# Patient Record
Sex: FEMALE | Race: BLACK OR AFRICAN AMERICAN | NOT HISPANIC OR LATINO | Employment: OTHER | URBAN - METROPOLITAN AREA
[De-identification: names, ages, dates, MRNs, and addresses within clinical notes are randomized per-mention and may not be internally consistent; named-entity substitution may affect disease eponyms.]

---

## 2017-03-28 ENCOUNTER — TELEPHONE (OUTPATIENT)
Dept: GASTROENTEROLOGY | Facility: CLINIC | Age: 62
End: 2017-03-28

## 2017-03-28 NOTE — TELEPHONE ENCOUNTER
Unable to leave message. Pt voice mailbox full. Pt's appt for today 3/28/17 needs to be rescheduled. Dr. Roper out sick.

## 2021-11-02 ENCOUNTER — DOCUMENT SCAN (OUTPATIENT)
Dept: HOME HEALTH SERVICES | Facility: HOSPITAL | Age: 66
End: 2021-11-02
Payer: MEDICARE

## 2022-01-18 ENCOUNTER — HOSPITAL ENCOUNTER (OUTPATIENT)
Facility: HOSPITAL | Age: 67
Discharge: PSYCHIATRIC OR SUBSTANCE USE FACILITY | End: 2022-01-20
Attending: EMERGENCY MEDICINE | Admitting: EMERGENCY MEDICINE
Payer: MEDICARE

## 2022-01-18 DIAGNOSIS — R41.82 ALTERED MENTAL STATUS: ICD-10-CM

## 2022-01-18 DIAGNOSIS — F03.90 MAJOR NEUROCOGNITIVE DISORDER: ICD-10-CM

## 2022-01-18 DIAGNOSIS — F20.9 SCHIZOPHRENIA, UNSPECIFIED TYPE: ICD-10-CM

## 2022-01-18 DIAGNOSIS — R74.8 HIGH SERUM TREPONEMA-SPECIFIC ANTIBODY TITER: ICD-10-CM

## 2022-01-18 DIAGNOSIS — A52.3 NEUROSYPHILIS: ICD-10-CM

## 2022-01-18 DIAGNOSIS — A53.0 POSITIVE RPR TEST: Primary | ICD-10-CM

## 2022-01-18 PROBLEM — F19.10 POLYSUBSTANCE ABUSE: Status: ACTIVE | Noted: 2022-01-18

## 2022-01-18 LAB
ALBUMIN SERPL BCP-MCNC: 4.2 G/DL (ref 3.5–5.2)
ALP SERPL-CCNC: 111 U/L (ref 55–135)
ALT SERPL W/O P-5'-P-CCNC: 29 U/L (ref 10–44)
AMPHET+METHAMPHET UR QL: NEGATIVE
ANION GAP SERPL CALC-SCNC: 9 MMOL/L (ref 8–16)
APAP SERPL-MCNC: <3 UG/ML (ref 10–20)
AST SERPL-CCNC: 19 U/L (ref 10–40)
BARBITURATES UR QL SCN>200 NG/ML: NEGATIVE
BASOPHILS # BLD AUTO: 0.06 K/UL (ref 0–0.2)
BASOPHILS NFR BLD: 0.7 % (ref 0–1.9)
BENZODIAZ UR QL SCN>200 NG/ML: NEGATIVE
BILIRUB SERPL-MCNC: 0.2 MG/DL (ref 0.1–1)
BILIRUB UR QL STRIP: NEGATIVE
BUN SERPL-MCNC: 22 MG/DL (ref 8–23)
BZE UR QL SCN: NEGATIVE
CALCIUM SERPL-MCNC: 9.5 MG/DL (ref 8.7–10.5)
CANNABINOIDS UR QL SCN: NEGATIVE
CHLORIDE SERPL-SCNC: 102 MMOL/L (ref 95–110)
CLARITY UR: CLEAR
CO2 SERPL-SCNC: 29 MMOL/L (ref 23–29)
COLOR UR: YELLOW
CREAT SERPL-MCNC: 0.7 MG/DL (ref 0.5–1.4)
CREAT UR-MCNC: 109.9 MG/DL (ref 15–325)
CTP QC/QA: YES
DIFFERENTIAL METHOD: ABNORMAL
EOSINOPHIL # BLD AUTO: 0.1 K/UL (ref 0–0.5)
EOSINOPHIL NFR BLD: 1.3 % (ref 0–8)
ERYTHROCYTE [DISTWIDTH] IN BLOOD BY AUTOMATED COUNT: 14.1 % (ref 11.5–14.5)
EST. GFR  (AFRICAN AMERICAN): >60 ML/MIN/1.73 M^2
EST. GFR  (NON AFRICAN AMERICAN): >60 ML/MIN/1.73 M^2
ETHANOL SERPL-MCNC: <10 MG/DL
GLUCOSE SERPL-MCNC: 128 MG/DL (ref 70–110)
GLUCOSE UR QL STRIP: NEGATIVE
HCT VFR BLD AUTO: 42.5 % (ref 37–48.5)
HGB BLD-MCNC: 13.1 G/DL (ref 12–16)
HGB UR QL STRIP: NEGATIVE
IMM GRANULOCYTES # BLD AUTO: 0.03 K/UL (ref 0–0.04)
IMM GRANULOCYTES NFR BLD AUTO: 0.4 % (ref 0–0.5)
KETONES UR QL STRIP: NEGATIVE
LEUKOCYTE ESTERASE UR QL STRIP: NEGATIVE
LYMPHOCYTES # BLD AUTO: 2.9 K/UL (ref 1–4.8)
LYMPHOCYTES NFR BLD: 34.7 % (ref 18–48)
MAGNESIUM SERPL-MCNC: 2 MG/DL (ref 1.6–2.6)
MCH RBC QN AUTO: 27.4 PG (ref 27–31)
MCHC RBC AUTO-ENTMCNC: 30.8 G/DL (ref 32–36)
MCV RBC AUTO: 89 FL (ref 82–98)
METHADONE UR QL SCN>300 NG/ML: NEGATIVE
MONOCYTES # BLD AUTO: 0.9 K/UL (ref 0.3–1)
MONOCYTES NFR BLD: 10.4 % (ref 4–15)
NEUTROPHILS # BLD AUTO: 4.3 K/UL (ref 1.8–7.7)
NEUTROPHILS NFR BLD: 52.5 % (ref 38–73)
NITRITE UR QL STRIP: NEGATIVE
NRBC BLD-RTO: 0 /100 WBC
OPIATES UR QL SCN: NEGATIVE
PCP UR QL SCN>25 NG/ML: NEGATIVE
PH UR STRIP: 5 [PH] (ref 5–8)
PLATELET # BLD AUTO: 180 K/UL (ref 150–450)
PMV BLD AUTO: 10.4 FL (ref 9.2–12.9)
POTASSIUM SERPL-SCNC: 4.3 MMOL/L (ref 3.5–5.1)
PROT SERPL-MCNC: 7.2 G/DL (ref 6–8.4)
PROT UR QL STRIP: NEGATIVE
RBC # BLD AUTO: 4.78 M/UL (ref 4–5.4)
SARS-COV-2 RDRP RESP QL NAA+PROBE: NEGATIVE
SODIUM SERPL-SCNC: 140 MMOL/L (ref 136–145)
SP GR UR STRIP: 1.02 (ref 1–1.03)
TOXICOLOGY INFORMATION: NORMAL
TSH SERPL DL<=0.005 MIU/L-ACNC: 0.68 UIU/ML (ref 0.4–4)
URN SPEC COLLECT METH UR: NORMAL
UROBILINOGEN UR STRIP-ACNC: NEGATIVE EU/DL
WBC # BLD AUTO: 8.21 K/UL (ref 3.9–12.7)

## 2022-01-18 PROCEDURE — 63600175 PHARM REV CODE 636 W HCPCS: Performed by: EMERGENCY MEDICINE

## 2022-01-18 PROCEDURE — 80307 DRUG TEST PRSMV CHEM ANLYZR: CPT | Performed by: EMERGENCY MEDICINE

## 2022-01-18 PROCEDURE — 80143 DRUG ASSAY ACETAMINOPHEN: CPT | Performed by: EMERGENCY MEDICINE

## 2022-01-18 PROCEDURE — 82077 ASSAY SPEC XCP UR&BREATH IA: CPT | Performed by: EMERGENCY MEDICINE

## 2022-01-18 PROCEDURE — 81003 URINALYSIS AUTO W/O SCOPE: CPT | Mod: 59 | Performed by: EMERGENCY MEDICINE

## 2022-01-18 PROCEDURE — G0425 INPT/ED TELECONSULT30: HCPCS | Mod: 95,,, | Performed by: PSYCHIATRY & NEUROLOGY

## 2022-01-18 PROCEDURE — 80053 COMPREHEN METABOLIC PANEL: CPT | Performed by: EMERGENCY MEDICINE

## 2022-01-18 PROCEDURE — 99285 EMERGENCY DEPT VISIT HI MDM: CPT | Mod: 25

## 2022-01-18 PROCEDURE — 96365 THER/PROPH/DIAG IV INF INIT: CPT

## 2022-01-18 PROCEDURE — 96366 THER/PROPH/DIAG IV INF ADDON: CPT

## 2022-01-18 PROCEDURE — 25000003 PHARM REV CODE 250: Performed by: EMERGENCY MEDICINE

## 2022-01-18 PROCEDURE — G0378 HOSPITAL OBSERVATION PER HR: HCPCS

## 2022-01-18 PROCEDURE — 85025 COMPLETE CBC W/AUTO DIFF WBC: CPT | Performed by: EMERGENCY MEDICINE

## 2022-01-18 PROCEDURE — G0425 PR INPT TELEHEALTH CONSULT 30M: ICD-10-PCS | Mod: 95,,, | Performed by: PSYCHIATRY & NEUROLOGY

## 2022-01-18 PROCEDURE — U0002 COVID-19 LAB TEST NON-CDC: HCPCS | Performed by: EMERGENCY MEDICINE

## 2022-01-18 PROCEDURE — 84443 ASSAY THYROID STIM HORMONE: CPT | Performed by: EMERGENCY MEDICINE

## 2022-01-18 PROCEDURE — 83735 ASSAY OF MAGNESIUM: CPT | Performed by: EMERGENCY MEDICINE

## 2022-01-18 PROCEDURE — 25000003 PHARM REV CODE 250: Performed by: NURSE PRACTITIONER

## 2022-01-18 RX ORDER — ACETAMINOPHEN 500 MG
1000 TABLET ORAL EVERY 6 HOURS PRN
Status: DISCONTINUED | OUTPATIENT
Start: 2022-01-18 | End: 2022-01-20 | Stop reason: HOSPADM

## 2022-01-18 RX ORDER — ACETAMINOPHEN 500 MG
1000 TABLET ORAL EVERY 8 HOURS PRN
Status: DISCONTINUED | OUTPATIENT
Start: 2022-01-18 | End: 2022-01-18

## 2022-01-18 RX ORDER — ONDANSETRON 2 MG/ML
4 INJECTION INTRAMUSCULAR; INTRAVENOUS EVERY 6 HOURS PRN
Status: DISCONTINUED | OUTPATIENT
Start: 2022-01-18 | End: 2022-01-20 | Stop reason: HOSPADM

## 2022-01-18 RX ORDER — SODIUM CHLORIDE 0.9 % (FLUSH) 0.9 %
10 SYRINGE (ML) INJECTION EVERY 12 HOURS PRN
Status: DISCONTINUED | OUTPATIENT
Start: 2022-01-18 | End: 2022-01-20 | Stop reason: HOSPADM

## 2022-01-18 RX ORDER — ENOXAPARIN SODIUM 100 MG/ML
40 INJECTION SUBCUTANEOUS EVERY 24 HOURS
Status: DISCONTINUED | OUTPATIENT
Start: 2022-01-19 | End: 2022-01-20 | Stop reason: HOSPADM

## 2022-01-18 RX ADMIN — PENICILLIN G POTASSIUM 4 MILLION UNITS: 5000000 INJECTION, POWDER, FOR SOLUTION INTRAMUSCULAR; INTRAVENOUS at 11:01

## 2022-01-18 RX ADMIN — ACETAMINOPHEN 1000 MG: 500 TABLET ORAL at 11:01

## 2022-01-18 NOTE — ED PROVIDER NOTES
Encounter Date: 1/18/2022    SCRIBE #1 NOTE: I, Ramses Liriano, am scribing for, and in the presence of,  Mario Gurrola MD. I have scribed the following portions of the note - Other sections scribed: HPI, ROS.       History     Chief Complaint   Patient presents with    Psychiatric Evaluation     Pt BIB [place] [employer] in handcuffs. Pt sent here from the [facility] with PEC in place. Pt sent to ED for bizarre behavior and possible admission to [facility]. Denies SI/HI/AH/VH     This is a 66 year old female for psychiatric evaluation. Patient states that she came from [facility], where she was sent from [facility]. Her initial placement was for treatment of depression. She was told that she could not go home yet as there was no transport available. Patient does not endorse any hallucinations, suicidal thoughts, or thoughts of hurting others. She states that she would simply like to go home. Denies any other symptoms, including chills, fevers, sweats, ear pain, sore throat, eye problems, cough, shortness of breath, chest pain, abdominal pain, vomiting, diarrhea, dysuria, arm or leg pain, rashes, or headaches. No PMHx of schizophrenia.    The history is provided by the patient.     Review of patient's allergies indicates:  No Known Allergies  No past medical history on file.  No past surgical history on file.  No family history on file.     Review of Systems   Constitutional: Negative for chills, diaphoresis and fever.   HENT: Negative for ear pain and sore throat.    Eyes: Negative for pain.   Respiratory: Negative for cough and shortness of breath.    Cardiovascular: Negative for chest pain.   Gastrointestinal: Negative for abdominal pain, diarrhea and vomiting.   Genitourinary: Negative for dysuria.   Musculoskeletal: Negative for arthralgias, joint swelling and myalgias.   Skin: Negative for rash.   Neurological: Negative for headaches.   Psychiatric/Behavioral: Negative for agitation and  suicidal ideas.       Physical Exam     Initial Vitals [01/18/22 1428]   BP Pulse Resp Temp SpO2   132/62 72 18 98.3 °F (36.8 °C) 97 %      MAP       --         Physical Exam  The patient was examined specifically for the following:   General:No significant distress, Good color, Warm and dry. Head and neck:Scalp atraumatic, Neck supple. Neurological:Appropriate conversation, Gross motor deficits. Eyes:Conjugate gaze, Clear corneas. ENT: No epistaxis. Cardiac: Regular rate and rhythm, Grossly normal heart tones. Pulmonary: Wheezing, Rales. Gastrointestinal: Abdominal tenderness, Abdominal distention. Musculoskeletal: Extremity deformity, Apparent pain with range of motion of the joints. Skin: Rash.   The findings on examination were normal except for the following:  Patient has an elevated BMI.  Lungs are clear.  Heart tones are normal.  The abdomen is soft.  The patient denies being suicidal homicidal or psychotic.  ED Course   Procedures  Labs Reviewed   CBC W/ AUTO DIFFERENTIAL - Abnormal; Notable for the following components:       Result Value    MCHC 30.8 (*)     All other components within normal limits   COMPREHENSIVE METABOLIC PANEL - Abnormal; Notable for the following components:    Glucose 128 (*)     All other components within normal limits   ACETAMINOPHEN LEVEL - Abnormal; Notable for the following components:    Acetaminophen (Tylenol), Serum <3.0 (*)     All other components within normal limits   TSH   URINALYSIS, REFLEX TO URINE CULTURE    Narrative:     Specimen Source->Urine   DRUG SCREEN PANEL, URINE EMERGENCY    Narrative:     Specimen Source->Urine   ALCOHOL,MEDICAL (ETHANOL)   MAGNESIUM   MAGNESIUM   SARS-COV-2 RDRP GENE          Imaging Results          CT Head Without Contrast (Final result)  Result time 01/18/22 19:20:42    Final result by Maya Blank MD (01/18/22 19:20:42)                 Impression:      No acute intracranial abnormality detected.  No significant  change.      Electronically signed by: Maya Blank  Date:    01/18/2022  Time:    19:20             Narrative:    EXAMINATION:  CT OF THE HEAD WITHOUT    CLINICAL HISTORY:  Altered mental status, unspecifiedMental status change, unknown cause;    TECHNIQUE:  5 mm unenhanced axial images were obtained from the skull base to the vertex.    COMPARISON:  08/04/2021    FINDINGS:  The ventricles, basal cisterns, and cortical sulci are within normal limits for patient's stated age.  Senescent calcifications are seen in bilateral basal ganglia.  There is no acute intracranial hemorrhage, territorial infarct or mass effect, or midline shift. In visualized paranasal sinuses, there is mild bilateral ethmoid and maxillary sinus mucoperiosteal thickening.  There are remote bilateral lamina papyracea fractures.                                     Medications - No data to display  Medical Decision Making:   History:   Old Medical Records: I decided to obtain old medical records.    This patient arrives to the emergency room under pec with the wrong date on it.  Pec is not valid.  The [facility] to [facility].  She had bizarre behavior at [facility].  Base else is concerned that the patient has organic brain disease.  Despite her history of treated schizophrenia.  She had a positive treponemal antibody on January 11, 2022 as well as a RPR titer of 1-2.  There was concern for neurosyphilis.  Neurology evaluated the patient in the emergency room a CT of the head was unremarkable.  Neurology recommends discharge to Psychiatry after treatment with IM penicillin.  Hospital medicine is concerned that the patient may have neurosyphilis and may require a spinal tap and treatment with IV penicillin.  Dr. Anaya will develop the final disposition.          Scribe Attestation:   Scribe #1: I performed the above scribed service and the documentation accurately describes the services I performed. I attest  to the accuracy of the note.                 Clinical Impression:   Final diagnoses:  [R41.82] Altered mental status  [A53.0] Positive RPR test (Primary)  [R74.8] High serum Treponema-specific antibody titer  [F20.9] Schizophrenia, unspecified type               I personally performed the services described in this documentation.  All medical record  entries made by the scribe are at my direction and in my presence.  Signed, Dr. Galileo Gurrola MD  01/18/22 2042

## 2022-01-18 NOTE — ED NOTES
Pt presents to ED today via EMS with a PEC order.  Per EMS, pt was having bizarre behavior.  At this time, pt denies SI, and denies HI.  Pt is alert to self, place and situation.  Denies knowledge of PEC order.  Pt is in no acute distress at this time, ED sitter at bedside.

## 2022-01-19 PROBLEM — A53.9 SYPHILIS, UNSPECIFIED: Status: ACTIVE | Noted: 2022-01-18

## 2022-01-19 LAB
ANION GAP SERPL CALC-SCNC: 10 MMOL/L (ref 8–16)
BASOPHILS # BLD AUTO: 0.04 K/UL (ref 0–0.2)
BASOPHILS NFR BLD: 0.6 % (ref 0–1.9)
BUN SERPL-MCNC: 19 MG/DL (ref 8–23)
CALCIUM SERPL-MCNC: 8.6 MG/DL (ref 8.7–10.5)
CHLORIDE SERPL-SCNC: 106 MMOL/L (ref 95–110)
CO2 SERPL-SCNC: 27 MMOL/L (ref 23–29)
CREAT SERPL-MCNC: 0.7 MG/DL (ref 0.5–1.4)
DIFFERENTIAL METHOD: ABNORMAL
EOSINOPHIL # BLD AUTO: 0.1 K/UL (ref 0–0.5)
EOSINOPHIL NFR BLD: 2.3 % (ref 0–8)
ERYTHROCYTE [DISTWIDTH] IN BLOOD BY AUTOMATED COUNT: 14.4 % (ref 11.5–14.5)
EST. GFR  (AFRICAN AMERICAN): >60 ML/MIN/1.73 M^2
EST. GFR  (NON AFRICAN AMERICAN): >60 ML/MIN/1.73 M^2
GLUCOSE SERPL-MCNC: 97 MG/DL (ref 70–110)
HCT VFR BLD AUTO: 43.7 % (ref 37–48.5)
HGB BLD-MCNC: 13.1 G/DL (ref 12–16)
IMM GRANULOCYTES # BLD AUTO: 0.02 K/UL (ref 0–0.04)
IMM GRANULOCYTES NFR BLD AUTO: 0.3 % (ref 0–0.5)
INR PPP: 1 (ref 0.8–1.2)
LYMPHOCYTES # BLD AUTO: 2.4 K/UL (ref 1–4.8)
LYMPHOCYTES NFR BLD: 39.1 % (ref 18–48)
MCH RBC QN AUTO: 27.9 PG (ref 27–31)
MCHC RBC AUTO-ENTMCNC: 30 G/DL (ref 32–36)
MCV RBC AUTO: 93 FL (ref 82–98)
MONOCYTES # BLD AUTO: 0.7 K/UL (ref 0.3–1)
MONOCYTES NFR BLD: 11.2 % (ref 4–15)
NEUTROPHILS # BLD AUTO: 2.9 K/UL (ref 1.8–7.7)
NEUTROPHILS NFR BLD: 46.5 % (ref 38–73)
NRBC BLD-RTO: 0 /100 WBC
PLATELET # BLD AUTO: 151 K/UL (ref 150–450)
PMV BLD AUTO: 10.9 FL (ref 9.2–12.9)
POTASSIUM SERPL-SCNC: 4.1 MMOL/L (ref 3.5–5.1)
PROTHROMBIN TIME: 10.9 SEC (ref 9–12.5)
RBC # BLD AUTO: 4.7 M/UL (ref 4–5.4)
SODIUM SERPL-SCNC: 143 MMOL/L (ref 136–145)
WBC # BLD AUTO: 6.16 K/UL (ref 3.9–12.7)

## 2022-01-19 PROCEDURE — 99204 OFFICE O/P NEW MOD 45 MIN: CPT | Mod: ,,, | Performed by: INTERNAL MEDICINE

## 2022-01-19 PROCEDURE — 85610 PROTHROMBIN TIME: CPT | Performed by: RADIOLOGY

## 2022-01-19 PROCEDURE — 96372 THER/PROPH/DIAG INJ SC/IM: CPT | Mod: 59

## 2022-01-19 PROCEDURE — 25000003 PHARM REV CODE 250: Performed by: EMERGENCY MEDICINE

## 2022-01-19 PROCEDURE — 63600175 PHARM REV CODE 636 W HCPCS: Performed by: NURSE PRACTITIONER

## 2022-01-19 PROCEDURE — 99204 PR OFFICE/OUTPT VISIT, NEW, LEVL IV, 45-59 MIN: ICD-10-PCS | Mod: ,,, | Performed by: INTERNAL MEDICINE

## 2022-01-19 PROCEDURE — G0378 HOSPITAL OBSERVATION PER HR: HCPCS

## 2022-01-19 PROCEDURE — 63600175 PHARM REV CODE 636 W HCPCS: Performed by: EMERGENCY MEDICINE

## 2022-01-19 PROCEDURE — 80048 BASIC METABOLIC PNL TOTAL CA: CPT | Performed by: NURSE PRACTITIONER

## 2022-01-19 PROCEDURE — 25000003 PHARM REV CODE 250: Performed by: NURSE PRACTITIONER

## 2022-01-19 PROCEDURE — 99284 EMERGENCY DEPT VISIT MOD MDM: CPT | Mod: ,,, | Performed by: PSYCHIATRY & NEUROLOGY

## 2022-01-19 PROCEDURE — 99284 PR EMERGENCY DEPT VISIT,LEVEL IV: ICD-10-PCS | Mod: ,,, | Performed by: PSYCHIATRY & NEUROLOGY

## 2022-01-19 PROCEDURE — 85025 COMPLETE CBC W/AUTO DIFF WBC: CPT | Performed by: NURSE PRACTITIONER

## 2022-01-19 RX ORDER — FUROSEMIDE 20 MG/1
20 TABLET ORAL DAILY
COMMUNITY
Start: 2022-01-04

## 2022-01-19 RX ORDER — MIRTAZAPINE 15 MG/1
15 TABLET, FILM COATED ORAL
COMMUNITY

## 2022-01-19 RX ORDER — KETOCONAZOLE 20 MG/G
CREAM TOPICAL
COMMUNITY
Start: 2022-01-04

## 2022-01-19 RX ORDER — ALBUTEROL SULFATE 90 UG/1
2 AEROSOL, METERED RESPIRATORY (INHALATION) EVERY 6 HOURS PRN
COMMUNITY
Start: 2022-01-11 | End: 2023-01-11

## 2022-01-19 RX ORDER — NITROFURANTOIN 25; 75 MG/1; MG/1
100 CAPSULE ORAL 2 TIMES DAILY
Status: ON HOLD | COMMUNITY
Start: 2022-01-11 | End: 2022-01-20 | Stop reason: HOSPADM

## 2022-01-19 RX ORDER — ARIPIPRAZOLE 10 MG/1
10 TABLET ORAL DAILY
COMMUNITY
Start: 2022-01-04

## 2022-01-19 RX ORDER — AMLODIPINE BESYLATE 10 MG/1
10 TABLET ORAL DAILY
COMMUNITY
Start: 2022-01-11 | End: 2022-07-10

## 2022-01-19 RX ORDER — SERTRALINE HYDROCHLORIDE 100 MG/1
100 TABLET, FILM COATED ORAL 2 TIMES DAILY
Status: ON HOLD | COMMUNITY
Start: 2022-01-04 | End: 2022-01-20 | Stop reason: HOSPADM

## 2022-01-19 RX ORDER — PREDNISONE 50 MG/1
50 TABLET ORAL DAILY
Status: ON HOLD | COMMUNITY
Start: 2022-01-11 | End: 2022-01-20 | Stop reason: HOSPADM

## 2022-01-19 RX ORDER — LANOLIN ALCOHOL/MO/W.PET/CERES
100 CREAM (GRAM) TOPICAL DAILY
COMMUNITY

## 2022-01-19 RX ORDER — PNV NO.95/FERROUS FUM/FOLIC AC 28MG-0.8MG
100 TABLET ORAL DAILY
COMMUNITY

## 2022-01-19 RX ORDER — PYRIDOXINE HCL (VITAMIN B6) 100 MG
50 TABLET ORAL DAILY
COMMUNITY

## 2022-01-19 RX ORDER — IBUPROFEN 400 MG/1
400 TABLET ORAL EVERY 6 HOURS PRN
COMMUNITY
Start: 2022-01-04

## 2022-01-19 RX ORDER — OXYBUTYNIN CHLORIDE 5 MG/1
5 TABLET ORAL 2 TIMES DAILY
Status: ON HOLD | COMMUNITY
Start: 2022-01-11 | End: 2022-01-20 | Stop reason: HOSPADM

## 2022-01-19 RX ADMIN — ACETAMINOPHEN 1000 MG: 500 TABLET ORAL at 12:01

## 2022-01-19 RX ADMIN — PENICILLIN G POTASSIUM 4 MILLION UNITS: 5000000 INJECTION, POWDER, FOR SOLUTION INTRAMUSCULAR; INTRAVENOUS at 04:01

## 2022-01-19 RX ADMIN — PENICILLIN G POTASSIUM 4 MILLION UNITS: 5000000 INJECTION, POWDER, FOR SOLUTION INTRAMUSCULAR; INTRAVENOUS at 07:01

## 2022-01-19 RX ADMIN — PENICILLIN G POTASSIUM 4 MILLION UNITS: 5000000 INJECTION, POWDER, FOR SOLUTION INTRAMUSCULAR; INTRAVENOUS at 12:01

## 2022-01-19 RX ADMIN — ENOXAPARIN SODIUM 40 MG: 80 INJECTION SUBCUTANEOUS at 05:01
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
## 2022-01-19 NOTE — ASSESSMENT & PLAN NOTE
Consulted neuro, Interventional radiologist, ID  Consulted SW for possible IV Abx outpt   Lumbar puncture in am  IV PCN

## 2022-01-19 NOTE — ED NOTES
Antibiotic infusing. Pt denies needs at this time. Will continue to monitor. ED tech Ricco at bedside for direct observation.

## 2022-01-19 NOTE — ED NOTES
Pt resting on bed in psych safe room, respirations even and unlabored. ED tech Adreyon at bedside for direct observation.

## 2022-01-19 NOTE — ED NOTES
Pt sleeping, respirations even and unlabored, in no acute distress. ED tech Ricco at bedside for direct observation.

## 2022-01-19 NOTE — HPI
66 year old female presented to the ED for psychiatric evaluation. Patient reports she came from [facility], where she was sent from [facility]. Per ED notes: She had bizarre behavior at [facility].  Iffi Alma  is concerned that the patient has organic brain disease.  Despite her history of treated schizophrenia.  She had a positive treponemal antibody on January 11, 2022 as well as a RPR titer of 1-2.  There was concern for neurosyphilis.  Neurology evaluated the patient in the emergency room a CT of the head was unremarkable.  Neurology recommends discharge to Psychiatry after treatment with IM penicillin.  Hospital medicine is concerned that the patient may have neurosyphilis and may require a spinal tap and treatment with IV penicillin.

## 2022-01-19 NOTE — H&P
[facility] Emergency Dept  [facility] Medicine  History & Physical    Patient Name: Aide Hernández  MRN: [id number]  Patient Class: OP- Observation  Admission Date: 1/18/2022  Attending Physician: Chiquita Shannon MD   Primary Care Provider: Adan Stringer MD         Patient information was obtained from patient and ER records.     Subjective:     Principal Problem:Neurosyphilis    Chief Complaint:   Chief Complaint   Patient presents with    Psychiatric Evaluation     Pt BIB [place] [employer] in handcuffs. Pt sent here from the [facility] with PEC in place. Pt sent to ED for bizarre behavior and possible admission to [facility]. Denies SI/HI/AH/VH        HPI: 66 year old female presented to the ED for psychiatric evaluation. Patient reports she came from [facility], where she was sent from [facility]. Per ED notes: She had bizarre behavior at [facility].  [facility]  is concerned that the patient has organic brain disease.  Despite her history of treated schizophrenia.  She had a positive treponemal antibody on January 11, 2022 as well as a RPR titer of 1-2.  There was concern for neurosyphilis.  Neurology evaluated the patient in the emergency room a CT of the head was unremarkable.  Neurology recommends discharge to Psychiatry after treatment with IM penicillin.  Hospital medicine is concerned that the patient may have neurosyphilis and may require a spinal tap and treatment with IV penicillin.      No past medical history on file.    No past surgical history on file.    Review of patient's allergies indicates:  No Known Allergies    No current facility-administered medications on file prior to encounter.     No current outpatient medications on file prior to encounter.     Family History    None       Tobacco Use    Smoking status: Not on file    Smokeless tobacco: Not on file   Substance and Sexual Activity    Alcohol use: Not on file    Drug use: Not on file    Sexual activity:  Not on file     Review of Systems   Constitutional: Negative for chills, fatigue and fever.   Eyes: Negative for photophobia and visual disturbance.   Respiratory: Negative for cough and shortness of breath.    Cardiovascular: Negative for chest pain, palpitations and leg swelling.   Gastrointestinal: Negative for abdominal pain, diarrhea, nausea and vomiting.   Genitourinary: Negative for dysuria, frequency and urgency.   Musculoskeletal:        Hurting all over   Skin: Negative for pallor, rash and wound.   Neurological: Negative for light-headedness and headaches.   Psychiatric/Behavioral: Negative for confusion and decreased concentration.        Per facility bizarre behavior      Objective:     Vital Signs (Most Recent):  Temp: 98.3 °F (36.8 °C) (01/18/22 1428)  Pulse: 72 (01/18/22 1428)  Resp: 18 (01/18/22 1428)  BP: 132/62 (01/18/22 1428)  SpO2: 97 % (01/18/22 1428) Vital Signs (24h Range):  Temp:  [98.3 °F (36.8 °C)] 98.3 °F (36.8 °C)  Pulse:  [72] 72  Resp:  [18] 18  SpO2:  [97 %] 97 %  BP: (132)/(62) 132/62     Weight: 116.1 kg (256 lb)  Body mass index is 42.6 kg/m².    Physical Exam  Vitals reviewed.   Constitutional:       Appearance: Normal appearance.   HENT:      Head: Normocephalic and atraumatic.      Mouth/Throat:      Mouth: Mucous membranes are moist.   Eyes:      Extraocular Movements: Extraocular movements intact.      Pupils: Pupils are equal, round, and reactive to light.   Cardiovascular:      Rate and Rhythm: Normal rate and regular rhythm.      Pulses: Normal pulses.      Heart sounds: Normal heart sounds.   Pulmonary:      Effort: Pulmonary effort is normal.      Breath sounds: Normal breath sounds.   Abdominal:      General: Bowel sounds are normal.      Palpations: Abdomen is soft.   Musculoskeletal:      Cervical back: Normal range of motion.      Comments: Extremity deformity, Apparent pain with range of motion of the joints. Skin: Rash.    Skin:     General: Skin is warm and dry.    Neurological:      Mental Status: She is alert and oriented to person, place, and time. Mental status is at baseline.   Psychiatric:         Mood and Affect: Mood normal.           CRANIAL NERVES     CN III, IV, VI   Pupils are equal, round, and reactive to light.       Significant Labs: All pertinent labs within the past 24 hours have been reviewed.    Significant Imaging: I have reviewed all pertinent imaging results/findings within the past 24 hours.    Assessment/Plan:     * Neurosyphilis  Consulted neuro, Interventional radiologist, ID  Consulted SW for possible IV Abx outpt   Lumbar puncture in am  IV PCN        Major neurocognitive disorder  Consult psych  Psych recs: as the patient is currently a gravely disabled secondary to psychiatric illness. Recommend involuntary placement in an inpatient psychiatric facility once the patient is medically stable      Polysubstance abuse  Education on drug and alcohol abuse...  Patient currently in Rehab been there for 3 weeks       VTE Risk Mitigation (From admission, onward)         Ordered     enoxaparin injection 40 mg  Daily         01/18/22 2257     IP VTE HIGH RISK PATIENT  Once         01/18/22 2257     Place sequential compression device  Until discontinued         01/18/22 2257              As clarification, on 1/18/2022 @ 2300, patient should be placed in hospital observation services under my care in collaboration with MD Gay Holland NP  Department of Hospital Medicine   [facility] [place] - Emergency Dept

## 2022-01-19 NOTE — PROVIDER PROGRESS NOTES - EMERGENCY DEPT.
Patient received as sign-out from Dr. Gurrola pending discussion with colleagues related to disposition.    67 yo female with history of depression and poly substance abuse presents with bizarre behavior from [facility] (rehab) where she had been sent by [facility]. Patient had a positive treponema pallidum antibody 1/11/22 (see CareEverywhere). There is concern that behavior changes are related to neurosyphilis.    Patient likely requires LP for CSF to rule in or exclude neurosyphilis.  However, per lab, CSF-RPR is send-out.     I have placed patient in OBS for IR-guided LP to obtain CSF to evaluate for neurosyphilis.  I have also ordered IV PCN to treat neurosyphilis pending LP.  I have discussed patient with NP Gay Ritter of OBS service as well as Dr. José Nevarez of [facility] medicine.     Brianna Anaya

## 2022-01-19 NOTE — ASSESSMENT & PLAN NOTE
Consult psych  Psych recs: as the patient is currently a gravely disabled secondary to psychiatric illness. Recommend involuntary placement in an inpatient psychiatric facility once the patient is medically stable

## 2022-01-19 NOTE — CONSULTS
[facility] Emergency Dept  Neurology  Consult Note    Patient Name: Aide Hernández  MRN: [id number]  Admission Date: 2022  Hospital Length of Stay: 0 days  Code Status: Full Code   Attending Provider: Shoaib Hidalgo MD   Consulting Provider: Mario Aggarwal MD  Primary Care Physician: Adan Stringer MD  Principal Problem:Syphilis, unspecified    Consults  Subjective:     Chief Complaint:  AMS    HPI: 65 y/o female was sent from [facility] to ED for psychiatric evaluation. Pt had been in a behavioral facility prior to [facility]. Ms. Hernández presented bizarre behavior. Upon asking pt there reason for being in hospital today she states that police went to get her. Denies violent behavior, suicidal or homicidal ideations. No headaches, visual or speech disturbances, vertigo, weakness or numbness of limbs. She is fully oriented and have no difficulty following commands.    No past medical history on file.    No past surgical history on file.    Review of patient's allergies indicates:  No Known Allergies    Current Neurological Medications:     No current facility-administered medications on file prior to encounter.     Current Outpatient Medications on File Prior to Encounter   Medication Sig    albuterol (PROVENTIL/VENTOLIN HFA) 90 mcg/actuation inhaler Inhale 2 puffs into the lungs every 6 (six) hours as needed.    amLODIPine (NORVASC) 10 MG tablet Take 10 mg by mouth once daily.    ARIPiprazole (ABILIFY) 10 MG Tab Take 10 mg by mouth once daily.    cyanocobalamin (VITAMIN B-12) 100 MCG tablet Take 100 mcg by mouth once daily.    folic acid/multivit-min/lutein (CENTRUM SILVER ORAL) Take by mouth.    furosemide (LASIX) 20 MG tablet Take 20 mg by mouth once daily.    ibuprofen (ADVIL,MOTRIN) 400 MG tablet Take 400 mg by mouth every 6 (six) hours as needed.    ipratropium (ATROVENT HFA) 17 mcg/actuation inhaler Inhale 2 puffs into the lungs.    ketoconazole (NIZORAL) 2 % cream SMARTSI  Topical Every Night    mirtazapine (REMERON) 15 MG tablet Take 15 mg by mouth.    nitrofurantoin, macrocrystal-monohydrate, (MACROBID) 100 MG capsule Take 100 mg by mouth 2 (two) times daily.    oxybutynin (DITROPAN) 5 MG Tab Take 5 mg by mouth 2 (two) times daily.    predniSONE (DELTASONE) 50 MG Tab Take 50 mg by mouth once daily.    sertraline (ZOLOFT) 100 MG tablet Take 100 mg by mouth 2 (two) times daily.    thiamine 100 MG tablet Take 100 mg by mouth once daily.    pyridoxine, vitamin B6, (VITAMIN B-6) 100 MG Tab Take 50 mg by mouth once daily.      Family History    None       Tobacco Use    Smoking status: Not on file    Smokeless tobacco: Not on file   Substance and Sexual Activity    Alcohol use: Not on file    Drug use: Not on file    Sexual activity: Not on file     Review of Systems   Constitutional: Negative for fever.   HENT: Negative for trouble swallowing.    Eyes: Negative for photophobia.   Respiratory: Negative for shortness of breath.    Cardiovascular: Negative for chest pain.   Gastrointestinal: Negative for abdominal pain.   Genitourinary: Negative for dysuria.   Musculoskeletal: Negative for back pain.   Neurological: Negative for headaches.   Psychiatric/Behavioral: Negative for confusion.     Objective:     Vital Signs (Most Recent):  Temp: 98.5 °F (36.9 °C) (01/19/22 0730)  Pulse: 66 (01/19/22 0730)  Resp: 18 (01/19/22 0730)  BP: 132/60 (01/19/22 0730)  SpO2: 100 % (01/19/22 0730) Vital Signs (24h Range):  Temp:  [97.6 °F (36.4 °C)-98.5 °F (36.9 °C)] 98.5 °F (36.9 °C)  Pulse:  [61-70] 66  Resp:  [16-18] 18  SpO2:  [95 %-100 %] 100 %  BP: (117-132)/(56-73) 132/60     Weight: 116.1 kg (256 lb)  Body mass index is 42.6 kg/m².    Physical Exam  Constitutional:       General: She is not in acute distress.     Appearance: She is not ill-appearing.   HENT:      Head: Normocephalic.      Right Ear: External ear normal.      Left Ear: External ear normal.   Eyes:      General:          Right eye: No discharge.         Left eye: No discharge.   Cardiovascular:      Rate and Rhythm: Normal rate.   Pulmonary:      Breath sounds: Normal breath sounds.   Abdominal:      General: Abdomen is flat.   Musculoskeletal:         General: No tenderness.      Cervical back: Neck supple.   Skin:     General: Skin is warm.   Neurological:      Mental Status: She is oriented to person, place, and time.      Deep Tendon Reflexes: Strength normal.   Psychiatric:         Speech: Speech normal.         NEUROLOGICAL EXAMINATION:     MENTAL STATUS   Oriented to person, place, and time.   Speech: speech is normal   Level of consciousness: drowsy    CRANIAL NERVES     CN III, IV, VI   Right pupil: Size: 2 mm. Shape: regular. Reactivity: brisk. Consensual response: intact.   Left pupil: Size: 2 mm. Shape: regular. Reactivity: brisk. Consensual response: intact.   Nystagmus: none   Ophthalmoparesis: none  Conjugate gaze: present    CN VII   Right facial weakness: none  Left facial weakness: none    CN IX, X   Palate: symmetric    CN XII   Tongue deviation: none    MOTOR EXAM     Strength   Strength 5/5 throughout.     SENSORY EXAM   Right arm light touch: normal  Left arm light touch: normal  Right leg light touch: normal  Left leg light touch: normal      Significant Labs:   CBC:   Recent Labs   Lab 01/18/22  1524 01/19/22  0525   WBC 8.21 6.16   HGB 13.1 13.1   HCT 42.5 43.7    151     CMP:   Recent Labs   Lab 01/18/22  1531 01/19/22  0525   * 97    143   K 4.3 4.1    106   CO2 29 27   BUN 22 19   CREATININE 0.7 0.7   CALCIUM 9.5 8.6*   MG 2.0  --    PROT 7.2  --    ALBUMIN 4.2  --    BILITOT 0.2  --    ALKPHOS 111  --    AST 19  --    ALT 29  --    ANIONGAP 9 10   EGFRNONAA >60 >60       Significant Imaging: I have reviewed all pertinent imaging results/findings within the past 24 hours.     Head CT  FINDINGS:  The ventricles, basal cisterns, and cortical sulci are within normal limits for  patient's stated age.  Senescent calcifications are seen in bilateral basal ganglia.  There is no acute intracranial hemorrhage, territorial infarct or mass effect, or midline shift. In visualized paranasal sinuses, there is mild bilateral ethmoid and maxillary sinus mucoperiosteal thickening.  There are remote bilateral lamina papyracea fractures.     Impression:     No acute intracranial abnormality detected.  No significant change.        Electronically signed by: Maya Blank  Date:                                            01/18/2022  Time:                                           19:20    Assessment and Plan:     65 y/o female consulted for MS    1. Enceohalopathy: at moment of evaluation pt is alert and oriented. No major metabolic abnormalities. No sensory or motor deficits on exam   Head CT shows no acute abnormalities.   -Pt admitted for LP given positive RPR.   -See ID recs.    Active Diagnoses:    Diagnosis Date Noted POA    PRINCIPAL PROBLEM:  Syphilis, unspecified [A53.9] 01/18/2022 Unknown    Polysubstance abuse [F19.10] 01/18/2022 Yes    Major neurocognitive disorder [F03.90] 01/18/2022 Yes      Problems Resolved During this Admission:       VTE Risk Mitigation (From admission, onward)         Ordered     enoxaparin injection 40 mg  Daily         01/18/22 2257     IP VTE HIGH RISK PATIENT  Once         01/18/22 2257     Place sequential compression device  Until discontinued         01/18/22 2257                Thank you for your consult. I will follow-up with patient. Please contact us if you have any additional questions.    Mario Aggarwal MD  Neurology  [facility] - [place] - Emergency Dept

## 2022-01-19 NOTE — PHARMACY MED REC
"Admission Medication History     The home medication history was taken by Teresa Blankenship CPhT.      You may go to "Admission" then "Reconcile Home Medications" tabs to review and/or act upon these items.      The home medication list has been updated by the Pharmacy department.    Please read ALL comments highlighted in yellow.    Please address this information as you see fit.     Feel free to contact us if you have any questions or require assistance.      The medications listed below were removed from the home medication list. Please reorder if appropriate:  Patient reports no longer taking the following medication(s):               Using [employer] and after interviewing patient medications were verified at the bedside.    Patient stated she does use her inhalers but she does not like the way they make her feel.    Patient states she take her Ibuprofen for the pain in her legs.    Patient is still taking Macrobid with the last dose being yesterday.    Teresa Blankenship CPhT.  [phone]                    .          "

## 2022-01-19 NOTE — SUBJECTIVE & OBJECTIVE
No past medical history on file.    No past surgical history on file.    Review of patient's allergies indicates:  No Known Allergies    No current facility-administered medications on file prior to encounter.     Current Outpatient Medications on File Prior to Encounter   Medication Sig    albuterol (PROVENTIL/VENTOLIN HFA) 90 mcg/actuation inhaler Inhale 2 puffs into the lungs every 6 (six) hours as needed.    amLODIPine (NORVASC) 10 MG tablet Take 10 mg by mouth once daily.    ARIPiprazole (ABILIFY) 10 MG Tab Take 10 mg by mouth once daily.    cyanocobalamin (VITAMIN B-12) 100 MCG tablet Take 100 mcg by mouth once daily.    folic acid/multivit-min/lutein (CENTRUM SILVER ORAL) Take by mouth.    furosemide (LASIX) 20 MG tablet Take 20 mg by mouth once daily.    ibuprofen (ADVIL,MOTRIN) 400 MG tablet Take 400 mg by mouth every 6 (six) hours as needed.    ipratropium (ATROVENT HFA) 17 mcg/actuation inhaler Inhale 2 puffs into the lungs.    ketoconazole (NIZORAL) 2 % cream SMARTSI Topical Every Night    mirtazapine (REMERON) 15 MG tablet Take 15 mg by mouth.    nitrofurantoin, macrocrystal-monohydrate, (MACROBID) 100 MG capsule Take 100 mg by mouth 2 (two) times daily.    oxybutynin (DITROPAN) 5 MG Tab Take 5 mg by mouth 2 (two) times daily.    predniSONE (DELTASONE) 50 MG Tab Take 50 mg by mouth once daily.    sertraline (ZOLOFT) 100 MG tablet Take 100 mg by mouth 2 (two) times daily.    thiamine 100 MG tablet Take 100 mg by mouth once daily.    pyridoxine, vitamin B6, (VITAMIN B-6) 100 MG Tab Take 50 mg by mouth once daily.     Family History    None       Tobacco Use    Smoking status: Not on file    Smokeless tobacco: Not on file   Substance and Sexual Activity    Alcohol use: Not on file    Drug use: Not on file    Sexual activity: Not on file     Review of Systems   Constitutional: Negative for chills, fatigue and fever.   Eyes: Negative for photophobia and visual disturbance.    Respiratory: Negative for cough and shortness of breath.    Cardiovascular: Negative for chest pain, palpitations and leg swelling.   Gastrointestinal: Negative for abdominal pain, diarrhea, nausea and vomiting.   Genitourinary: Negative for dysuria, frequency and urgency.   Musculoskeletal: Negative for back pain.   Skin: Negative for pallor, rash and wound.   Neurological: Negative for syncope, light-headedness and headaches.   Psychiatric/Behavioral: Positive for behavioral problems and suicidal ideas. Negative for confusion and decreased concentration.     Objective:     Vital Signs (Most Recent):  Temp: 98.5 °F (36.9 °C) (01/19/22 0730)  Pulse: 66 (01/19/22 0730)  Resp: 18 (01/19/22 0730)  BP: 132/60 (01/19/22 0730)  SpO2: 100 % (01/19/22 0730) Vital Signs (24h Range):  Temp:  [97.6 °F (36.4 °C)-98.5 °F (36.9 °C)] 98.5 °F (36.9 °C)  Pulse:  [61-72] 66  Resp:  [16-18] 18  SpO2:  [95 %-100 %] 100 %  BP: (117-132)/(56-73) 132/60     Weight: 116.1 kg (256 lb)  Body mass index is 42.6 kg/m².    Physical Exam  Vitals reviewed.   Constitutional:       General: She is not in acute distress.     Appearance: Normal appearance.   HENT:      Head: Normocephalic and atraumatic.      Mouth/Throat:      Mouth: Mucous membranes are moist.      Comments: Dental caries  Eyes:      Extraocular Movements: Extraocular movements intact.      Pupils: Pupils are equal, round, and reactive to light.   Cardiovascular:      Rate and Rhythm: Normal rate and regular rhythm.      Pulses: Normal pulses.      Heart sounds: Normal heart sounds. No murmur heard.      Pulmonary:      Effort: Pulmonary effort is normal.      Breath sounds: Normal breath sounds.   Abdominal:      General: Bowel sounds are normal.      Palpations: Abdomen is soft.   Musculoskeletal:         General: No swelling or tenderness.      Cervical back: Normal range of motion.   Skin:     General: Skin is warm and dry.   Neurological:      Mental Status: She is alert and  oriented to person, place, and time. Mental status is at baseline.   Psychiatric:      Comments: PEC'd           CRANIAL NERVES     CN III, IV, VI   Pupils are equal, round, and reactive to light.       Significant Labs: All pertinent labs within the past 24 hours have been reviewed.    Significant Imaging: I have reviewed all pertinent imaging results/findings within the past 24 hours.

## 2022-01-19 NOTE — ED NOTES
Pt sleeping on bed, respirations even and unlabored, in no distress. ED jaime Chacko at bedside for direct observation. Will continue to monitor.

## 2022-01-19 NOTE — PLAN OF CARE
[facility] Emergency Dept  Initial Discharge Assessment       Primary Care Provider: Adan Stringer MD    Admission Diagnosis: Neurosyphilis [A52.3]    Admission Date: 1/18/2022  Expected Discharge Date: 1/31/2022    Discharge Barriers Identified: Other (see comments) (Patient being assessed by psych.)    Payor: [employer] / Plan: [employer] / Product Type: Medicare Advantage /     Extended Emergency Contact Information  Primary Emergency Contact: Madison Hernández  Address: [address] [place] [address] [facility]  Home Phone: [phone]  Mobile Phone: [phone]  Relation: Sister    Discharge Plan A: Other  Discharge Plan B: Home Health    No Pharmacies Listed    Initial Assessment (most recent)       Adult Discharge Assessment - 01/19/22 1311          Discharge Assessment    Assessment Type Discharge Planning Assessment     Confirmed/corrected address, phone number and insurance Yes   Patient reported being at [facility] prior to coming to ED.    Source of Information patient     Communicated AQUILES with patient/caregiver Date not available/Unable to determine     Reason For Admission Neurosyphilis     Lives With other (see comments)   Patient was at [facility] (treatment facility) prior to admit.    Facility Arrived From: [facility] (treatment facility)     Do you expect to return to your current living situation? Other (see comments)   Not certain if she will be able to return to [facility].    Prior to hospitilization cognitive status: Unable to Assess     Current cognitive status: Alert/Oriented     Walking or Climbing Stairs Difficulty none     Dressing/Bathing Difficulty none     Equipment Currently Used at Home none     Readmission within 30 days? No     Patient currently being followed by outpatient case management? No     Do you currently have service(s) that help you manage your care at home? No     Do  "you take prescription medications? Yes     Do you have prescription coverage? Yes     Coverage Medicaid     Who is going to help you get home at discharge? Pt not certain if she will be able to return to [facility].  If not, she stated that she "would like to go home."  Then stated that she needed to try to find her an apartment.     How do you get to doctors appointments? health plan transportation     Are you on dialysis? No     Do you take coumadin? No     Discharge Plan A Other     Discharge Plan B Home Health     DME Needed Upon Discharge  other (see comments)   IVABX and supplies.    Discharge Plan discussed with: Patient     Discharge Barriers Identified Other (see comments)   Patient being assessed by psych.                  Patient was receiving treatment at [facility] prior to being sent to ED for evaluation.  Patient is from [facility].    Patient requesting assistance scheduling appt with [facility].              "

## 2022-01-19 NOTE — CONSULTS
Patient would not be able to receive HH (IV services) at  a residential treatment facility ([facility]).

## 2022-01-19 NOTE — SUBJECTIVE & OBJECTIVE
No past medical history on file.    No past surgical history on file.    Review of patient's allergies indicates:  No Known Allergies    No current facility-administered medications on file prior to encounter.     No current outpatient medications on file prior to encounter.     Family History    None       Tobacco Use    Smoking status: Not on file    Smokeless tobacco: Not on file   Substance and Sexual Activity    Alcohol use: Not on file    Drug use: Not on file    Sexual activity: Not on file     Review of Systems   Constitutional: Negative for chills, fatigue and fever.   Eyes: Negative for photophobia and visual disturbance.   Respiratory: Negative for cough and shortness of breath.    Cardiovascular: Negative for chest pain, palpitations and leg swelling.   Gastrointestinal: Negative for abdominal pain, diarrhea, nausea and vomiting.   Genitourinary: Negative for dysuria, frequency and urgency.   Musculoskeletal:        Hurting all over   Skin: Negative for pallor, rash and wound.   Neurological: Negative for light-headedness and headaches.   Psychiatric/Behavioral: Negative for confusion and decreased concentration.        Per facility bizarre behavior      Objective:     Vital Signs (Most Recent):  Temp: 98.3 °F (36.8 °C) (01/18/22 1428)  Pulse: 72 (01/18/22 1428)  Resp: 18 (01/18/22 1428)  BP: 132/62 (01/18/22 1428)  SpO2: 97 % (01/18/22 1428) Vital Signs (24h Range):  Temp:  [98.3 °F (36.8 °C)] 98.3 °F (36.8 °C)  Pulse:  [72] 72  Resp:  [18] 18  SpO2:  [97 %] 97 %  BP: (132)/(62) 132/62     Weight: 116.1 kg (256 lb)  Body mass index is 42.6 kg/m².    Physical Exam  Vitals reviewed.   Constitutional:       Appearance: Normal appearance.   HENT:      Head: Normocephalic and atraumatic.      Mouth/Throat:      Mouth: Mucous membranes are moist.   Eyes:      Extraocular Movements: Extraocular movements intact.      Pupils: Pupils are equal, round, and reactive to light.   Cardiovascular:      Rate and  Rhythm: Normal rate and regular rhythm.      Pulses: Normal pulses.      Heart sounds: Normal heart sounds.   Pulmonary:      Effort: Pulmonary effort is normal.      Breath sounds: Normal breath sounds.   Abdominal:      General: Bowel sounds are normal.      Palpations: Abdomen is soft.   Musculoskeletal:      Cervical back: Normal range of motion.      Comments: Extremity deformity, Apparent pain with range of motion of the joints. Skin: Rash.    Skin:     General: Skin is warm and dry.   Neurological:      Mental Status: She is alert and oriented to person, place, and time. Mental status is at baseline.   Psychiatric:         Mood and Affect: Mood normal.           CRANIAL NERVES     CN III, IV, VI   Pupils are equal, round, and reactive to light.       Significant Labs: All pertinent labs within the past 24 hours have been reviewed.    Significant Imaging: I have reviewed all pertinent imaging results/findings within the past 24 hours.

## 2022-01-19 NOTE — CONSULTS
Inpatient Radiology Pre-procedure Note    History of Present Illness:  Aide Hernández is a 66 y.o. female with PMHx of polysubstance abuse, treatment for schizophrenia and major neurocognitive disorder who presents with PEC order 2/2 bizarre behavior with delusions with new labs revealing abnormal RPR titers with concerns for neurosyphilis requiring CSF sampling for diagnosis and treatment planning.    A new inpatient IR consult received for fluoroscopic-guided LP and CSF sampling.    Admission H&P reviewed.  No past medical history on file.  No past surgical history on file.    Review of Systems:   As documented in primary team H&P    Home Meds:   Prior to Admission medications    Not on File     Scheduled Meds:    enoxaparin  40 mg Subcutaneous Daily    pencillin G potassium IVPB  4 Million Units Intravenous Q4H     Continuous Infusions:   PRN Meds:acetaminophen, ondansetron, sodium chloride 0.9%     Anticoagulants/Antiplatelets: no anticoagulation    Allergies: Review of patient's allergies indicates:  No Known Allergies     Sedation Hx: have not been any systemic reactions    Labs:  Recent Labs   Lab 01/19/22  0850   INR 1.0       Recent Labs   Lab 01/19/22  0525   WBC 6.16   HGB 13.1   HCT 43.7   MCV 93         Recent Labs   Lab 01/18/22  1531 01/18/22  1531 01/19/22  0525   *   < > 97      < > 143   K 4.3   < > 4.1      < > 106   CO2 29   < > 27   BUN 22   < > 19   CREATININE 0.7   < > 0.7   CALCIUM 9.5   < > 8.6*   MG 2.0  --   --    ALT 29  --   --    AST 19  --   --    ALBUMIN 4.2  --   --    BILITOT 0.2  --   --     < > = values in this interval not displayed.     Vitals:  Temp: 98.5 °F (36.9 °C) (01/19/22 0730)  Pulse: 66 (01/19/22 0730)  Resp: 18 (01/19/22 0730)  BP: 132/60 (01/19/22 0730)  SpO2: 100 % (01/19/22 0730)     A/P:  66 y.o. female with PMHx of polysubstance abuse, treatment for schizophrenia and major neurocognitive disorder who presents with PEC order 2/2 bizarre  behavior with delusions with new labs revealing abnormal RPR titers with concerns for neurosyphilis requiring CSF sampling for diagnosis and treatment planning.    1. Concern for neurosyphilis - Will attempt fluoroscopic-guided LP and CSF sampling with local anesthetic only pending telephone verbal consent from pts sister, as today's schedule permits.    Please continue to hold all non-essential anti-platelets, anti-coagulants and keep pt NPO after midnight.    Please place all pertinent fluid studies in epic prior to the procedure to ensure that the studies the ordering provider/team deem appropriate are performed.    Thank you for considering IR for the care of your patient.     Delfin Ng MD  Interventional Radiology

## 2022-01-19 NOTE — ASSESSMENT & PLAN NOTE
66M with h/o polysubstance abuse and schizophrenia admitted from a behavioral facility with bizarre behavior. Pt reports depression and SI. ID consulted for +RPR 1:2 with +tppa. Spoke with [facility] and they have documentation that she was treated for syphilis in 2013 and RPR has been 1:2 since then. 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 In future, if the RPR increases by more than one dilution (one dilution increase could represent lab error), than it could indicate re-infection which would require treatment again.     Pt with neg hep b s ab- needs hep B vaccination    Would screen for hcv ab as per cdc guidelines

## 2022-01-20 VITALS
OXYGEN SATURATION: 96 % | BODY MASS INDEX: 42.65 KG/M2 | SYSTOLIC BLOOD PRESSURE: 140 MMHG | HEIGHT: 65 IN | TEMPERATURE: 99 F | HEART RATE: 59 BPM | DIASTOLIC BLOOD PRESSURE: 66 MMHG | RESPIRATION RATE: 16 BRPM | WEIGHT: 256 LBS

## 2022-01-20 PROBLEM — Z86.19 HISTORY OF SYPHILIS: Status: ACTIVE | Noted: 2022-01-18

## 2022-01-20 LAB
ANION GAP SERPL CALC-SCNC: 8 MMOL/L (ref 8–16)
ANISOCYTOSIS BLD QL SMEAR: SLIGHT
BASOPHILS # BLD AUTO: 0.03 K/UL (ref 0–0.2)
BASOPHILS NFR BLD: 0.5 % (ref 0–1.9)
BUN SERPL-MCNC: 14 MG/DL (ref 8–23)
CALCIUM SERPL-MCNC: 8.9 MG/DL (ref 8.7–10.5)
CHLORIDE SERPL-SCNC: 103 MMOL/L (ref 95–110)
CO2 SERPL-SCNC: 29 MMOL/L (ref 23–29)
CREAT SERPL-MCNC: 0.6 MG/DL (ref 0.5–1.4)
DIFFERENTIAL METHOD: ABNORMAL
EOSINOPHIL # BLD AUTO: 0.2 K/UL (ref 0–0.5)
EOSINOPHIL NFR BLD: 2.6 % (ref 0–8)
ERYTHROCYTE [DISTWIDTH] IN BLOOD BY AUTOMATED COUNT: 14.1 % (ref 11.5–14.5)
EST. GFR  (AFRICAN AMERICAN): >60 ML/MIN/1.73 M^2
EST. GFR  (NON AFRICAN AMERICAN): >60 ML/MIN/1.73 M^2
GLUCOSE SERPL-MCNC: 84 MG/DL (ref 70–110)
HCT VFR BLD AUTO: 41.5 % (ref 37–48.5)
HGB BLD-MCNC: 12.9 G/DL (ref 12–16)
IMM GRANULOCYTES # BLD AUTO: 0.02 K/UL (ref 0–0.04)
IMM GRANULOCYTES NFR BLD AUTO: 0.4 % (ref 0–0.5)
LYMPHOCYTES # BLD AUTO: 2.8 K/UL (ref 1–4.8)
LYMPHOCYTES NFR BLD: 49.8 % (ref 18–48)
MCH RBC QN AUTO: 28.4 PG (ref 27–31)
MCHC RBC AUTO-ENTMCNC: 31.1 G/DL (ref 32–36)
MCV RBC AUTO: 91 FL (ref 82–98)
MONOCYTES # BLD AUTO: 0.7 K/UL (ref 0.3–1)
MONOCYTES NFR BLD: 12 % (ref 4–15)
NEUTROPHILS # BLD AUTO: 2 K/UL (ref 1.8–7.7)
NEUTROPHILS NFR BLD: 34.7 % (ref 38–73)
NRBC BLD-RTO: 0 /100 WBC
PLATELET # BLD AUTO: 154 K/UL (ref 150–450)
PMV BLD AUTO: 11.1 FL (ref 9.2–12.9)
POTASSIUM SERPL-SCNC: 4.5 MMOL/L (ref 3.5–5.1)
RBC # BLD AUTO: 4.54 M/UL (ref 4–5.4)
SODIUM SERPL-SCNC: 140 MMOL/L (ref 136–145)
WBC # BLD AUTO: 5.68 K/UL (ref 3.9–12.7)

## 2022-01-20 PROCEDURE — 80048 BASIC METABOLIC PNL TOTAL CA: CPT | Performed by: NURSE PRACTITIONER

## 2022-01-20 PROCEDURE — G0378 HOSPITAL OBSERVATION PER HR: HCPCS

## 2022-01-20 PROCEDURE — 85025 COMPLETE CBC W/AUTO DIFF WBC: CPT | Performed by: NURSE PRACTITIONER

## 2022-01-20 PROCEDURE — 36415 COLL VENOUS BLD VENIPUNCTURE: CPT | Performed by: NURSE PRACTITIONER

## 2022-01-20 RX ORDER — FUROSEMIDE 40 MG/1
40 TABLET ORAL ONCE
Status: DISCONTINUED | OUTPATIENT
Start: 2022-01-20 | End: 2022-01-20 | Stop reason: HOSPADM

## 2022-01-20 NOTE — PLAN OF CARE
01/20/22 0939   Final Note   Assessment Type Final Discharge Note   Anticipated Discharge Disposition Psych   Post-Acute Status   Post-Acute Authorization Placement   Post-Acute Placement Status Set-up Complete/Auth obtained   Pt accepted to [facility]- transportation arranged by [facility] and report provided to [facility] argueta nurse

## 2022-01-20 NOTE — NURSING
Pt transport here pt assisted to w/c without injury pt now leaving un it with security and 2 [employer] drivers at her side.

## 2022-01-20 NOTE — PROGRESS NOTES
ADT 32 entered to start process for inpatient psych placement. Per Md pt is medically ready for discharge

## 2022-01-20 NOTE — NURSING
Patient transferring  to [facility]  per MD order.  IV removed.  Catheter tip intact.  No distress noted.        VSS.  Afebrile.  No complaints of pain, N/V, diarrhea or SOB.  2 [employer]  here to transport patient, waiting on w/c transport.

## 2022-01-20 NOTE — PROGRESS NOTES
WRITTEN HEALTHCARE DISCHARGE INFORMATION      Things that YOU are RESPONSIBLE for to Manage Your Care At Home:     1. Getting your prescriptions filled.  2. Taking you medications as directed. DO NOT MISS ANY DOSES!  3. Going to your follow-up doctor appointments. This is important because it allows the doctor to monitor your progress and to determine if any changes need to be made to your treatment plan.     If you are unable to make your follow up appointments, please call the number listed and reschedule this appointment.      ____________HELP AT HOME____________________     Experiencing any SIGNS or SYMPTOMS: YOU CAN     Schedule a same day appopintment with your Primary Care Doctor or  you can call [facility] On Call Nurse Care Line for 24/7 assistance at [phone]     If you are experience any signs or symptoms that have become severe, Call 911 and come to your nearest Emergency Room.     Thank you for choosing [facility] and allowing us to care for you.   From your care management team:      You should receive a call from [facility] Discharge Department within 48-72 hours to help manage your care after discharge. Please try to make sure that you answer your phone for this important phone call.   Follow-up Information     Adan Stringer MD In 1 week.    Specialty: Internal Medicine  Contact information:  [id number]   [address]  [facility] [address] [phone]             [facility] PSY[facility]. Schedule an appointment as soon as possible for a visit in 2 weeks.    Specialty: Psychiatry  Contact information:  [address] [facility] [address]  [phone]           [facility].    Specialties: Behavioral Health, Psychiatry, Psychology, Psychiatric Facility  Why: Psych  Contact information:  [address] [facility] [address]  [phone]

## 2022-01-20 NOTE — PLAN OF CARE
Problem: Adult Inpatient Plan of Care  Goal: Plan of Care Review  Outcome: Ongoing, Progressing  Flowsheets (Taken 1/20/2022 0320)  Plan of Care Reviewed With: patient    Patient remains free from injury and falls this shift. Calm and cooperative. No altered behavior noted. Remains NPO since midnight for possible lumbar puncture today. [facility]EC sitter at bedside. Plan of care continued.

## 2022-01-20 NOTE — SUBJECTIVE & OBJECTIVE
Interval History:  Dr. Downs with Infectious Disease was able to obtain outside records demonstrating that patient's RPR titers have been constant for some time and patient underwent appropriate treatment for syphilis in 2013. Penicillin was discontinued.  Lumbar puncture was canceled.  Patient with seemingly appropriate behavior today.  Will plan for discharge back to Behavioral Facility tomorrow.    Review of Systems   Constitutional: Negative for chills, fatigue and fever.   Eyes: Negative for photophobia and visual disturbance.   Respiratory: Negative for cough and shortness of breath.    Cardiovascular: Negative for chest pain, palpitations and leg swelling.   Gastrointestinal: Negative for abdominal pain, diarrhea, nausea and vomiting.   Genitourinary: Negative for dysuria, frequency and urgency.   Skin: Negative for pallor, rash and wound.   Neurological: Negative for light-headedness and headaches.   Psychiatric/Behavioral: Negative for agitation, behavioral problems, confusion and decreased concentration.     Objective:     Vital Signs (Most Recent):  Temp: 97.9 °F (36.6 °C) (01/19/22 2024)  Pulse: (!) 55 (01/19/22 2024)  Resp: 14 (01/19/22 2024)  BP: 131/61 (01/19/22 2024)  SpO2: (!) 93 % (01/19/22 2024) Vital Signs (24h Range):  Temp:  [97.5 °F (36.4 °C)-98.5 °F (36.9 °C)] 97.9 °F (36.6 °C)  Pulse:  [55-70] 55  Resp:  [14-18] 14  SpO2:  [93 %-100 %] 93 %  BP: (117-145)/(56-73) 131/61     Weight: 116.1 kg (256 lb)  Body mass index is 42.6 kg/m².    Intake/Output Summary (Last 24 hours) at 1/19/2022 2248  Last data filed at 1/19/2022 1712  Gross per 24 hour   Intake 540 ml   Output --   Net 540 ml      Physical Exam  Vitals reviewed.   Constitutional:       Appearance: Normal appearance.   HENT:      Head: Normocephalic and atraumatic.      Mouth/Throat:      Mouth: Mucous membranes are moist.   Eyes:      Extraocular Movements: Extraocular movements intact.      Pupils: Pupils are equal, round, and  reactive to light.   Cardiovascular:      Rate and Rhythm: Normal rate and regular rhythm.      Pulses: Normal pulses.      Heart sounds: Normal heart sounds.   Pulmonary:      Effort: Pulmonary effort is normal.      Breath sounds: Normal breath sounds.   Abdominal:      General: Bowel sounds are normal.      Palpations: Abdomen is soft.   Musculoskeletal:      Cervical back: Normal range of motion.   Skin:     General: Skin is warm and dry.      Findings: No rash.   Neurological:      Mental Status: She is alert and oriented to person, place, and time. Mental status is at baseline.   Psychiatric:         Mood and Affect: Mood normal.         Behavior: Behavior normal.         Thought Content: Thought content normal.         Significant Labs: All pertinent labs within the past 24 hours have been reviewed.    Significant Imaging: I have reviewed all pertinent imaging results/findings within the past 24 hours.

## 2022-01-20 NOTE — PROGRESS NOTES
[facility] Medicine  Progress Note    Patient Name: Aide Hernández  MRN: [id number]  Patient Class: OP- Observation   Admission Date: 1/18/2022  Length of Stay: 0 days  Attending Physician: Shoaib Hidalgo MD  Primary Care Provider: Adan Stringer MD        Subjective:     Principal Problem:Syphilis, unspecified        HPI:  66 year old female presented to the ED for psychiatric evaluation. Patient reports she came from [facility], where she was sent from [facility]. Per ED notes: She had bizarre behavior at [facility].  [facility]  is concerned that the patient has organic brain disease.  Despite her history of treated schizophrenia.  She had a positive treponemal antibody on January 11, 2022 as well as a RPR titer of 1-2.  There was concern for neurosyphilis.  Neurology evaluated the patient in the emergency room a CT of the head was unremarkable.  Neurology recommends discharge to Psychiatry after treatment with IM penicillin.  Hospital medicine is concerned that the patient may have neurosyphilis and may require a spinal tap and treatment with IV penicillin.      Overview/Hospital Course:  No notes on file    Interval History:  Dr. Downs with Infectious Disease was able to obtain outside records demonstrating that patient's RPR titers have been constant for some time and patient underwent appropriate treatment for syphilis in 2013. Penicillin was discontinued.  Lumbar puncture was canceled.  Patient with seemingly appropriate behavior today.  Will plan for discharge back to Behavioral Facility tomorrow.    Review of Systems   Constitutional: Negative for chills, fatigue and fever.   Eyes: Negative for photophobia and visual disturbance.   Respiratory: Negative for cough and shortness of breath.    Cardiovascular: Negative for chest pain, palpitations and leg swelling.   Gastrointestinal: Negative for abdominal pain, diarrhea, nausea and vomiting.   Genitourinary:  Negative for dysuria, frequency and urgency.   Skin: Negative for pallor, rash and wound.   Neurological: Negative for light-headedness and headaches.   Psychiatric/Behavioral: Negative for agitation, behavioral problems, confusion and decreased concentration.     Objective:     Vital Signs (Most Recent):  Temp: 97.9 °F (36.6 °C) (01/19/22 2024)  Pulse: (!) 55 (01/19/22 2024)  Resp: 14 (01/19/22 2024)  BP: 131/61 (01/19/22 2024)  SpO2: (!) 93 % (01/19/22 2024) Vital Signs (24h Range):  Temp:  [97.5 °F (36.4 °C)-98.5 °F (36.9 °C)] 97.9 °F (36.6 °C)  Pulse:  [55-70] 55  Resp:  [14-18] 14  SpO2:  [93 %-100 %] 93 %  BP: (117-145)/(56-73) 131/61     Weight: 116.1 kg (256 lb)  Body mass index is 42.6 kg/m².    Intake/Output Summary (Last 24 hours) at 1/19/2022 2248  Last data filed at 1/19/2022 1712  Gross per 24 hour   Intake 540 ml   Output --   Net 540 ml      Physical Exam  Vitals reviewed.   Constitutional:       Appearance: Normal appearance.   HENT:      Head: Normocephalic and atraumatic.      Mouth/Throat:      Mouth: Mucous membranes are moist.   Eyes:      Extraocular Movements: Extraocular movements intact.      Pupils: Pupils are equal, round, and reactive to light.   Cardiovascular:      Rate and Rhythm: Normal rate and regular rhythm.      Pulses: Normal pulses.      Heart sounds: Normal heart sounds.   Pulmonary:      Effort: Pulmonary effort is normal.      Breath sounds: Normal breath sounds.   Abdominal:      General: Bowel sounds are normal.      Palpations: Abdomen is soft.   Musculoskeletal:      Cervical back: Normal range of motion.   Skin:     General: Skin is warm and dry.      Findings: No rash.   Neurological:      Mental Status: She is alert and oriented to person, place, and time. Mental status is at baseline.   Psychiatric:         Mood and Affect: Mood normal.         Behavior: Behavior normal.         Thought Content: Thought content normal.         Significant Labs: All pertinent labs  within the past 24 hours have been reviewed.    Significant Imaging: I have reviewed all pertinent imaging results/findings within the past 24 hours.      Assessment/Plan:      * Syphilis, unspecified  Status post IV penicillin.  This appears to be a treated issue.  No change in RPR titer indicates unlikely to be new infection.        Major neurocognitive disorder  Consult psych  Psych recs: as the patient is currently a gravely disabled secondary to psychiatric illness. Recommend involuntary placement in an inpatient psychiatric facility once the patient is medically stable      Polysubstance abuse  Education on drug and alcohol abuse...  Patient currently in Rehab been there for 3 weeks         VTE Risk Mitigation (From admission, onward)         Ordered     enoxaparin injection 40 mg  Daily         01/18/22 2257     IP VTE HIGH RISK PATIENT  Once         01/18/22 2257     Place sequential compression device  Until discontinued         01/18/22 2257                Discharge Planning   AQUILES: 1/20/2022     Code Status: Full Code   Is the patient medically ready for discharge?:     Reason for patient still in hospital (select all that apply): Consult recommendations and Pending disposition  Discharge Plan A: Other                  Shoaib Hidalgo MD  Department of Hospital Medicine   [facility] [place] [facility] [place]

## 2022-01-20 NOTE — ASSESSMENT & PLAN NOTE
Status post IV penicillin.  This appears to be a treated issue.  No change in RPR titer indicates unlikely to be new infection.

## 2022-01-27 NOTE — HOSPITAL COURSE
Dr. Downs with Infectious Disease was able to obtain outside records demonstrating that patient's RPR titers have been constant for some time and patient underwent appropriate treatment for syphilis in 2013. Penicillin was discontinued.  Lumbar puncture was canceled.  Patient with seemingly appropriate behavior today.  She will be discharged back to Behavioral Facility.

## 2022-01-27 NOTE — DISCHARGE SUMMARY
[facility] - [facility] Medicine  Discharge Summary      Patient Name: Aide Hernández  MRN: [id number]  Patient Class: OP- Observation  Admission Date: 1/18/2022  Hospital Length of Stay: 0 days  Discharge Date and Time: 1/20/2022 11:39 AM  Attending Physician: No att. providers found   Discharging Provider: Shoaib Hidalgo MD  Primary Care Provider: Adan Stringer MD      HPI:   66 year old female presented to the ED for psychiatric evaluation. Patient reports she came from [facility], where she was sent from [facility]. Per ED notes: She had bizarre behavior at [facility].  [facility]  is concerned that the patient has organic brain disease.  Despite her history of treated schizophrenia.  She had a positive treponemal antibody on January 11, 2022 as well as a RPR titer of 1-2.  There was concern for neurosyphilis.  Neurology evaluated the patient in the emergency room a CT of the head was unremarkable.  Neurology recommends discharge to Psychiatry after treatment with IM penicillin.  Hospital medicine is concerned that the patient may have neurosyphilis and may require a spinal tap and treatment with IV penicillin.      * No surgery found *      Hospital Course:   Dr. Dwons with Infectious Disease was able to obtain outside records demonstrating that patient's RPR titers have been constant for some time and patient underwent appropriate treatment for syphilis in 2013. Penicillin was discontinued.  Lumbar puncture was canceled.  Patient with seemingly appropriate behavior today.  She will be discharged back to Behavioral Facility.       Goals of Care Treatment Preferences:  Code Status: Full Code      Consults:   Consults (From admission, onward)        Status Ordering Provider     Inpatient consult to Social Work  Once        Provider:  (Not yet assigned)    ALBIN Ochoa     Inpatient consult to Infectious Diseases  Once        Provider:  Kaitlynn Downs MD     Completed ALBIN RUIZ     Inpatient consult to Interventional Radiology  Once        Provider:  Delfin Ng MD    Completed ALBIN RUIZ     Inpatient consult to neurology  Once        Provider:  Mario Aggarwal MD    Completed GREGORY IBARRA     Inpatient consult to Telemedicine - Psychiatry  Once        Provider:  Kathy Falcon MD    Completed GREGORY IBARRA          No new Assessment & Plan notes have been filed under this hospital service since the last note was generated.  Service: Hospital Medicine    Final Active Diagnoses:    Diagnosis Date Noted POA    PRINCIPAL PROBLEM:  History of syphilis [Z86.19] 01/18/2022 Yes    Polysubstance abuse [F19.10] 01/18/2022 Yes    Major neurocognitive disorder [F03.90] 01/18/2022 Yes      Problems Resolved During this Admission:       Discharged Condition: good    Disposition: Psychiatric Hospital    Follow Up:   Follow-up Information     Adan Stringer MD In 1 week.    Specialty: Internal Medicine  Contact information:  [address]   [address]  [facility] [id number] [phone]             [facility]. Schedule an appointment as soon as possible for a visit in 2 weeks.    Specialty: Psychiatry  Contact information:  [address] [facility] [address]  [phone]           [facility].    Specialties: Behavioral Health, Psychiatry, Psychology, Psychiatric Facility  Why: Psych  Contact information:  [address] [facility] [id number] [phone]                       Patient Instructions:      Diet Adult Regular     Notify your health care provider if you experience any of the following:  temperature >100.4     Notify your health care provider if you experience any of the following:  persistent nausea and vomiting or diarrhea     Notify your health care provider if you experience any of the following:  severe uncontrolled pain     Notify your health care provider if you experience any of the following:   redness, tenderness, or signs of infection (pain, swelling, redness, odor or green/yellow discharge around incision site)     Notify your health care provider if you experience any of the following:  difficulty breathing or increased cough     Notify your health care provider if you experience any of the following:  severe persistent headache     Notify your health care provider if you experience any of the following:  worsening rash     Notify your health care provider if you experience any of the following:  persistent dizziness, light-headedness, or visual disturbances     Notify your health care provider if you experience any of the following:  increased confusion or weakness       Significant Diagnostic Studies: Labs: CMP No results for input(s): NA, K, CL, CO2, GLU, BUN, CREATININE, CALCIUM, PROT, ALBUMIN, BILITOT, ALKPHOS, AST, ALT, ANIONGAP, ESTGFRAFRICA, EGFRNONAA in the last 48 hours. and CBC No results for input(s): WBC, HGB, HCT, PLT in the last 48 hours.    Pending Diagnostic Studies:     None         Medications:  Reconciled Home Medications:      Medication List      CONTINUE taking these medications    albuterol 90 mcg/actuation inhaler  Commonly known as: PROVENTIL/VENTOLIN HFA  Inhale 2 puffs into the lungs every 6 (six) hours as needed.     amLODIPine 10 MG tablet  Commonly known as: NORVASC  Take 10 mg by mouth once daily.     ARIPiprazole 10 MG Tab  Commonly known as: ABILIFY  Take 10 mg by mouth once daily.     CENTRUM SILVER ORAL  Take by mouth.     cyanocobalamin 100 MCG tablet  Commonly known as: VITAMIN B-12  Take 100 mcg by mouth once daily.     furosemide 20 MG tablet  Commonly known as: LASIX  Take 20 mg by mouth once daily.     ibuprofen 400 MG tablet  Commonly known as: ADVIL,MOTRIN  Take 400 mg by mouth every 6 (six) hours as needed.     ipratropium 17 mcg/actuation inhaler  Commonly known as: ATROVENT HFA  Inhale 2 puffs into the lungs.     ketoconazole 2 % cream  Commonly known as:  NIZORAL  SMARTSI Topical Every Night     mirtazapine 15 MG tablet  Commonly known as: REMERON  Take 15 mg by mouth.     thiamine 100 MG tablet  Take 100 mg by mouth once daily.     VITAMIN B-6 100 MG Tab  Generic drug: pyridoxine (vitamin B6)  Take 50 mg by mouth once daily.        STOP taking these medications    nitrofurantoin (macrocrystal-monohydrate) 100 MG capsule  Commonly known as: MACROBID     oxybutynin 5 MG Tab  Commonly known as: DITROPAN     predniSONE 50 MG Tab  Commonly known as: DELTASONE     sertraline 100 MG tablet  Commonly known as: ZOLOFT            Indwelling Lines/Drains at time of discharge:   Lines/Drains/Airways     None                 Time spent on the discharge of patient:  Greater than 30 minutes         Shoaib Hidalgo MD  Department of Hospital Medicine  [facility] - [place], [place] - [facility] [place]

## 2022-02-28 ENCOUNTER — DOCUMENT SCAN (OUTPATIENT)
Dept: HOME HEALTH SERVICES | Facility: HOSPITAL | Age: 67
End: 2022-02-28
Payer: MEDICAID

## 2022-03-21 ENCOUNTER — DOCUMENT SCAN (OUTPATIENT)
Dept: HOME HEALTH SERVICES | Facility: HOSPITAL | Age: 67
End: 2022-03-21
Payer: MEDICAID

## 2022-03-21 ENCOUNTER — DOCUMENT SCAN (OUTPATIENT)
Dept: HOME HEALTH SERVICES | Facility: HOSPITAL | Age: 67
End: 2022-03-21

## 2022-05-08 NOTE — ASSESSMENT & PLAN NOTE
Education on drug and alcohol abuse...  Patient currently in Rehab been there for 3 weeks      08-May-2022 21:25

## 2024-02-28 ENCOUNTER — PATIENT OUTREACH (OUTPATIENT)
Dept: ADMINISTRATIVE | Facility: CLINIC | Age: 69
End: 2024-02-28
Payer: MEDICAID

## 2025-03-19 ENCOUNTER — PATIENT OUTREACH (OUTPATIENT)
Dept: ADMINISTRATIVE | Facility: CLINIC | Age: 70
End: 2025-03-19
Payer: MEDICARE